# Patient Record
Sex: MALE | Race: WHITE | NOT HISPANIC OR LATINO
[De-identification: names, ages, dates, MRNs, and addresses within clinical notes are randomized per-mention and may not be internally consistent; named-entity substitution may affect disease eponyms.]

---

## 2017-01-12 PROBLEM — Z00.00 ENCOUNTER FOR PREVENTIVE HEALTH EXAMINATION: Status: ACTIVE | Noted: 2017-01-12

## 2017-01-25 ENCOUNTER — APPOINTMENT (OUTPATIENT)
Dept: CARDIOLOGY | Facility: CLINIC | Age: 72
End: 2017-01-25

## 2017-01-31 ENCOUNTER — APPOINTMENT (OUTPATIENT)
Dept: CARDIOLOGY | Facility: CLINIC | Age: 72
End: 2017-01-31

## 2017-05-17 ENCOUNTER — APPOINTMENT (OUTPATIENT)
Dept: CARDIOLOGY | Facility: CLINIC | Age: 72
End: 2017-05-17

## 2017-08-15 ENCOUNTER — APPOINTMENT (OUTPATIENT)
Dept: CARDIOLOGY | Facility: CLINIC | Age: 72
End: 2017-08-15
Payer: MEDICARE

## 2017-08-15 PROCEDURE — A9502: CPT

## 2017-08-15 PROCEDURE — 78452 HT MUSCLE IMAGE SPECT MULT: CPT

## 2017-08-15 PROCEDURE — 93015 CV STRESS TEST SUPVJ I&R: CPT

## 2017-08-17 ENCOUNTER — APPOINTMENT (OUTPATIENT)
Dept: CARDIOLOGY | Facility: CLINIC | Age: 72
End: 2017-08-17

## 2017-08-29 ENCOUNTER — APPOINTMENT (OUTPATIENT)
Dept: ELECTROPHYSIOLOGY | Facility: CLINIC | Age: 72
End: 2017-08-29
Payer: MEDICARE

## 2017-08-29 VITALS
SYSTOLIC BLOOD PRESSURE: 118 MMHG | DIASTOLIC BLOOD PRESSURE: 80 MMHG | HEIGHT: 71 IN | HEART RATE: 66 BPM | WEIGHT: 188 LBS | BODY MASS INDEX: 26.32 KG/M2

## 2017-08-29 DIAGNOSIS — Z87.891 PERSONAL HISTORY OF NICOTINE DEPENDENCE: ICD-10-CM

## 2017-08-29 DIAGNOSIS — Z78.9 OTHER SPECIFIED HEALTH STATUS: ICD-10-CM

## 2017-08-29 DIAGNOSIS — Z82.49 FAMILY HISTORY OF ISCHEMIC HEART DISEASE AND OTHER DISEASES OF THE CIRCULATORY SYSTEM: ICD-10-CM

## 2017-08-29 PROCEDURE — 93000 ELECTROCARDIOGRAM COMPLETE: CPT

## 2017-08-29 PROCEDURE — 99203 OFFICE O/P NEW LOW 30 MIN: CPT

## 2017-09-05 PROBLEM — Z78.9 SOCIAL ALCOHOL USE: Status: ACTIVE | Noted: 2017-09-05

## 2017-09-05 PROBLEM — Z82.49 FAMILY HISTORY OF CORONARY ARTERIOSCLEROSIS: Status: ACTIVE | Noted: 2017-09-05

## 2017-09-05 PROBLEM — Z87.891 FORMER SMOKER: Status: ACTIVE | Noted: 2017-09-05

## 2017-09-05 RX ORDER — METFORMIN HYDROCHLORIDE 500 MG/1
500 TABLET, COATED ORAL
Refills: 0 | Status: ACTIVE | COMMUNITY

## 2017-11-20 ENCOUNTER — RESULT REVIEW (OUTPATIENT)
Age: 72
End: 2017-11-20

## 2017-11-20 LAB — INR PPP: 2.7

## 2017-11-22 ENCOUNTER — CLINICAL ADVICE (OUTPATIENT)
Age: 72
End: 2017-11-22

## 2017-11-30 ENCOUNTER — OTHER (OUTPATIENT)
Age: 72
End: 2017-11-30

## 2017-11-30 LAB — INR PPP: 2.9

## 2017-12-20 ENCOUNTER — RESULT REVIEW (OUTPATIENT)
Age: 72
End: 2017-12-20

## 2017-12-20 LAB — INR PPP: 2.5

## 2018-01-05 ENCOUNTER — RESULT REVIEW (OUTPATIENT)
Age: 73
End: 2018-01-05

## 2018-01-05 LAB — INR PPP: 3

## 2018-01-22 ENCOUNTER — RESULT REVIEW (OUTPATIENT)
Age: 73
End: 2018-01-22

## 2018-01-22 LAB — INR PPP: 3

## 2018-01-25 ENCOUNTER — RESULT REVIEW (OUTPATIENT)
Age: 73
End: 2018-01-25

## 2018-01-25 LAB — INR PPP: 2.5

## 2018-01-26 ENCOUNTER — RESULT REVIEW (OUTPATIENT)
Age: 73
End: 2018-01-26

## 2018-01-26 LAB — INR PPP: 2.5

## 2018-02-09 LAB — INR PPP: 2.8

## 2018-02-23 ENCOUNTER — RESULT REVIEW (OUTPATIENT)
Age: 73
End: 2018-02-23

## 2018-02-23 LAB — INR PPP: 2.9

## 2018-03-08 ENCOUNTER — RESULT REVIEW (OUTPATIENT)
Age: 73
End: 2018-03-08

## 2018-03-08 LAB — INR PPP: 2.9

## 2018-03-22 LAB — INR PPP: 2.9

## 2018-04-09 LAB — INR PPP: 3

## 2018-04-24 LAB — INR PPP: 2.9

## 2018-05-18 ENCOUNTER — RESULT REVIEW (OUTPATIENT)
Age: 73
End: 2018-05-18

## 2018-05-18 LAB — INR PPP: 2.6

## 2018-06-04 ENCOUNTER — RESULT REVIEW (OUTPATIENT)
Age: 73
End: 2018-06-04

## 2018-06-04 LAB — INR PPP: 2.5

## 2018-06-19 ENCOUNTER — RESULT REVIEW (OUTPATIENT)
Age: 73
End: 2018-06-19

## 2018-06-19 LAB — INR PPP: 2.9

## 2018-06-27 LAB — INR PPP: NORMAL

## 2018-06-28 ENCOUNTER — APPOINTMENT (OUTPATIENT)
Dept: CARDIOLOGY | Facility: CLINIC | Age: 73
End: 2018-06-28
Payer: MEDICARE

## 2018-06-28 LAB — INR PPP: 3.2 RATIO

## 2018-06-28 PROCEDURE — 85610 PROTHROMBIN TIME: CPT | Mod: QW

## 2018-06-29 ENCOUNTER — APPOINTMENT (OUTPATIENT)
Dept: CARDIOLOGY | Facility: CLINIC | Age: 73
End: 2018-06-29
Payer: MEDICARE

## 2018-06-29 LAB — INR PPP: 2 RATIO

## 2018-06-29 PROCEDURE — 85610 PROTHROMBIN TIME: CPT | Mod: QW

## 2018-07-09 ENCOUNTER — RESULT REVIEW (OUTPATIENT)
Age: 73
End: 2018-07-09

## 2018-07-09 LAB — INR PPP: 2.5

## 2018-07-24 ENCOUNTER — MEDICATION RENEWAL (OUTPATIENT)
Age: 73
End: 2018-07-24

## 2018-07-24 ENCOUNTER — RX RENEWAL (OUTPATIENT)
Age: 73
End: 2018-07-24

## 2018-07-24 ENCOUNTER — RESULT REVIEW (OUTPATIENT)
Age: 73
End: 2018-07-24

## 2018-07-24 LAB — INR PPP: 2.5

## 2018-08-04 ENCOUNTER — TRANSCRIPTION ENCOUNTER (OUTPATIENT)
Age: 73
End: 2018-08-04

## 2018-08-07 ENCOUNTER — RESULT REVIEW (OUTPATIENT)
Age: 73
End: 2018-08-07

## 2018-08-07 LAB — INR PPP: 2.5

## 2018-08-15 ENCOUNTER — RESULT REVIEW (OUTPATIENT)
Age: 73
End: 2018-08-15

## 2018-08-15 LAB — INR PPP: 2.7

## 2018-08-29 ENCOUNTER — NON-APPOINTMENT (OUTPATIENT)
Age: 73
End: 2018-08-29

## 2018-08-29 ENCOUNTER — APPOINTMENT (OUTPATIENT)
Dept: CARDIOLOGY | Facility: CLINIC | Age: 73
End: 2018-08-29
Payer: MEDICARE

## 2018-08-29 VITALS
DIASTOLIC BLOOD PRESSURE: 68 MMHG | BODY MASS INDEX: 27.02 KG/M2 | WEIGHT: 193 LBS | HEIGHT: 71 IN | HEART RATE: 75 BPM | SYSTOLIC BLOOD PRESSURE: 120 MMHG

## 2018-08-29 PROCEDURE — 99215 OFFICE O/P EST HI 40 MIN: CPT

## 2018-08-29 PROCEDURE — 93000 ELECTROCARDIOGRAM COMPLETE: CPT

## 2018-08-29 RX ORDER — WARFARIN 5 MG/1
5 TABLET ORAL
Qty: 135 | Refills: 3 | Status: DISCONTINUED | COMMUNITY
Start: 2018-07-24 | End: 2018-08-29

## 2018-09-04 LAB — INR PPP: 2.7

## 2018-09-17 ENCOUNTER — RESULT REVIEW (OUTPATIENT)
Age: 73
End: 2018-09-17

## 2018-09-17 LAB — INR PPP: 1.7

## 2018-09-18 ENCOUNTER — RESULT REVIEW (OUTPATIENT)
Age: 73
End: 2018-09-18

## 2018-09-19 ENCOUNTER — APPOINTMENT (OUTPATIENT)
Dept: CARDIOLOGY | Facility: CLINIC | Age: 73
End: 2018-09-19
Payer: MEDICARE

## 2018-09-19 LAB — INR PPP: 2

## 2018-09-19 PROCEDURE — 85610 PROTHROMBIN TIME: CPT | Mod: QW

## 2018-09-23 ENCOUNTER — RX RENEWAL (OUTPATIENT)
Age: 73
End: 2018-09-23

## 2018-10-05 LAB — INR PPP: 3

## 2018-10-22 LAB — INR PPP: 2.7

## 2018-11-02 ENCOUNTER — RESULT REVIEW (OUTPATIENT)
Age: 73
End: 2018-11-02

## 2018-11-05 LAB — INR PPP: 2.9

## 2018-11-08 ENCOUNTER — RESULT REVIEW (OUTPATIENT)
Age: 73
End: 2018-11-08

## 2018-11-08 ENCOUNTER — APPOINTMENT (OUTPATIENT)
Dept: CARDIOLOGY | Facility: CLINIC | Age: 73
End: 2018-11-08
Payer: MEDICARE

## 2018-11-08 LAB
INR PPP: 3.6 RATIO
QUALITY CONTROL: YES

## 2018-11-08 PROCEDURE — 85610 PROTHROMBIN TIME: CPT | Mod: QW

## 2018-11-13 ENCOUNTER — RESULT REVIEW (OUTPATIENT)
Age: 73
End: 2018-11-13

## 2018-11-13 ENCOUNTER — APPOINTMENT (OUTPATIENT)
Dept: CARDIOLOGY | Facility: CLINIC | Age: 73
End: 2018-11-13
Payer: MEDICARE

## 2018-11-13 LAB — INR PPP: 2.3 RATIO

## 2018-11-13 PROCEDURE — 85610 PROTHROMBIN TIME: CPT | Mod: QW

## 2018-11-20 ENCOUNTER — APPOINTMENT (OUTPATIENT)
Dept: CARDIOLOGY | Facility: CLINIC | Age: 73
End: 2018-11-20

## 2018-12-17 ENCOUNTER — RESULT REVIEW (OUTPATIENT)
Age: 73
End: 2018-12-17

## 2018-12-17 LAB — INR PPP: 3

## 2018-12-28 ENCOUNTER — RESULT REVIEW (OUTPATIENT)
Age: 73
End: 2018-12-28

## 2018-12-28 LAB — INR PPP: 2.9

## 2019-01-14 ENCOUNTER — RESULT REVIEW (OUTPATIENT)
Age: 74
End: 2019-01-14

## 2019-01-14 LAB — INR PPP: 3

## 2019-02-06 ENCOUNTER — RESULT REVIEW (OUTPATIENT)
Age: 74
End: 2019-02-06

## 2019-02-06 LAB — INR PPP: 3

## 2019-03-05 LAB — INR PPP: 3

## 2019-03-20 LAB — INR PPP: 3

## 2019-04-19 ENCOUNTER — CLINICAL ADVICE (OUTPATIENT)
Age: 74
End: 2019-04-19

## 2019-04-25 ENCOUNTER — APPOINTMENT (OUTPATIENT)
Dept: CARDIOLOGY | Facility: CLINIC | Age: 74
End: 2019-04-25
Payer: MEDICARE

## 2019-04-25 ENCOUNTER — NON-APPOINTMENT (OUTPATIENT)
Age: 74
End: 2019-04-25

## 2019-04-25 VITALS
BODY MASS INDEX: 27.02 KG/M2 | DIASTOLIC BLOOD PRESSURE: 60 MMHG | OXYGEN SATURATION: 97 % | HEART RATE: 66 BPM | HEIGHT: 71 IN | WEIGHT: 193 LBS | SYSTOLIC BLOOD PRESSURE: 104 MMHG

## 2019-04-25 PROCEDURE — 99214 OFFICE O/P EST MOD 30 MIN: CPT

## 2019-04-25 PROCEDURE — 93000 ELECTROCARDIOGRAM COMPLETE: CPT

## 2019-04-25 RX ORDER — APIXABAN 5 MG/1
5 TABLET, FILM COATED ORAL
Qty: 180 | Refills: 0 | Status: ACTIVE | COMMUNITY

## 2019-04-25 RX ORDER — SILDENAFIL CITRATE 50 MG/1
50 TABLET, FILM COATED ORAL
Refills: 0 | Status: DISCONTINUED | COMMUNITY
End: 2019-04-25

## 2019-04-25 RX ORDER — ENOXAPARIN SODIUM 100 MG/ML
80 INJECTION SUBCUTANEOUS
Qty: 10 | Refills: 0 | Status: DISCONTINUED | COMMUNITY
Start: 2018-08-29 | End: 2019-04-25

## 2019-04-25 RX ORDER — WARFARIN SODIUM 5 MG/1
5 TABLET ORAL
Refills: 0 | Status: DISCONTINUED | COMMUNITY
End: 2019-04-25

## 2019-04-25 NOTE — REASON FOR VISIT
[Follow-Up - Clinic] : a clinic follow-up of [Atrial Fibrillation] : atrial fibrillation [Hyperlipidemia] : hyperlipidemia [Hypertension] : hypertension [FreeTextEntry1] : 73-year-old comes in for preoperative cardiac assessment prior to endoscopy\par He plays one and a half hour of singles tennis on a regular basis without any complaint of chest pain, shortness of breath, PND, orthopnea.\par He has no pedal edema.\par He has no dizziness or syncopal episode.\par He has no claudication pain.\par He has no recent hospital admission for acute coronary syndrome, syncope, or congestive heart failure.\par He has not had any neurological/cardioembolic event.\par He has not had any bleeding complications.\par His INR is stable.\par He has no history of complications related to anesthesia in the past.\par

## 2019-04-25 NOTE — HISTORY OF PRESENT ILLNESS
[FreeTextEntry1] : Medical history includes\par Chronic atrial fibrillation. Asymptomatic pauses. On chronic anticoagulation with warfarin as his chores without any complications. Follows his INR at home, which remains between 2 and 3.\par Hypercholesterolemia on statin therapy.\par Type 2 diabetes mellitus, controlled. No complications.\par Coronary artery disease. Nonobstructive. Branch disease. Coronary angiography. 2013. The first diagonal 50%.\par Thoracic aortic dilatation. Stable. Last echocardiogram 2017\par Tonsillar cancer. Status post surgery and radiation 2014

## 2019-04-25 NOTE — DISCUSSION/SUMMARY
[FreeTextEntry1] : 74-year-old with excellent functional status of >5 METs without any signs of CAD, CHF , stable atrial fibrillation , and on chronic anticoagulation in the presence of preserved LV systolic function, nonischemic myocardial perfusion scan in 2017. EKG at present without any acute changes.\par \par At present, there are no active cardiac conditions.\par No recent unstable coronary syndrome, decompensated heart failure, severe valvular heart disease or significant dysrhythmias.\par The clinical benefit of the proposed procedure outweighs the associated cardiovascular risk.\par Risk not attenuated with further cardiovascular testing.\par Prior testing as outlined above.\par Control blood pressure, heart rate, pulse oximetry perioperatively.\par If required appropriate intravenous medication for the outpatient oral medication for blood pressure, heart rate control.\par DVT prophylaxis as per indication.\par For surgery and invasive procedures, recommend to discontinue apixaban at least 24-48 hours prior to elective surgery or invasive procedures with a moderate-to-high risk of clinically significant bleeding. Anticoagulation bridging during the 48 hours apixaban is interrupted and prior to the intervention is not generally required. Reinitiate apixaban when adequate hemostasis has been achieved.  If oral therapy cannot be administered, then consider administration of a parenteral anticoagulant. Note excess discontinuation of any oral anticoagulant, including apixaban, increases the risk of thrombotic events. Patient was counseled and verbalizes understanding of these associated risks\par It can be restarted postoperatively, if no intervention is done, and no active bleeding. Same day in the evening or the next day. This can be also decided by gastroenterologist or discussed with cardiologist if needed to facilitate based on intervention done.\par \par From general cardiac point of view. He would also have his echocardiogram to evaluate her ejection fraction wall motion and presence of coronary artery disease and chronic atrial fibrillation. This should not have any significant impact on perioperative risk assessment. He will be seen by me in one year or for any change in his symptoms.

## 2019-04-25 NOTE — PHYSICAL EXAM
[General Appearance - Well Developed] : well developed [Normal Appearance] : normal appearance [General Appearance - Well Nourished] : well nourished [Well Groomed] : well groomed [No Deformities] : no deformities [General Appearance - In No Acute Distress] : no acute distress [Normal Conjunctiva] : the conjunctiva exhibited no abnormalities [Eyelids - No Xanthelasma] : the eyelids demonstrated no xanthelasmas [No Oral Pallor] : no oral pallor [Normal Oral Mucosa] : normal oral mucosa [Normal Jugular Venous A Waves Present] : normal jugular venous A waves present [No Oral Cyanosis] : no oral cyanosis [Normal Jugular Venous V Waves Present] : normal jugular venous V waves present [No Jugular Venous Escobar A Waves] : no jugular venous escobar A waves [Exaggerated Use Of Accessory Muscles For Inspiration] : no accessory muscle use [Respiration, Rhythm And Depth] : normal respiratory rhythm and effort [Heart Sounds] : normal S1 and S2 [Auscultation Breath Sounds / Voice Sounds] : lungs were clear to auscultation bilaterally [Murmurs] : no murmurs present [Arterial Pulses Normal] : the arterial pulses were normal [Edema] : no peripheral edema present [Veins - Varicosity Changes] : no varicosital changes were noted in the lower extremities [FreeTextEntry1] :  irregular [Abdomen Soft] : soft [Abdomen Tenderness] : non-tender [Abdomen Mass (___ Cm)] : no abdominal mass palpated [Gait - Sufficient For Exercise Testing] : the gait was sufficient for exercise testing [Abnormal Walk] : normal gait [Cyanosis, Localized] : no localized cyanosis [Nail Clubbing] : no clubbing of the fingernails [Petechial Hemorrhages (___cm)] : no petechial hemorrhages [Skin Color & Pigmentation] : normal skin color and pigmentation [] : no rash [No Venous Stasis] : no venous stasis [Skin Lesions] : no skin lesions [No Skin Ulcers] : no skin ulcer [No Xanthoma] : no  xanthoma was observed [Oriented To Time, Place, And Person] : oriented to person, place, and time [Mood] : the mood was normal [Affect] : the affect was normal [No Anxiety] : not feeling anxious

## 2019-04-25 NOTE — ASSESSMENT
[FreeTextEntry1] : EKG  ordered and interpreted by me. August 29, 2018  indication atrial fibrillation. Preoperative assessment. Interpretation by me. Atrial fibrillation. Nonspecific ST-T changes.\par \par Labs which were done in August were reviewed, which showed stable CBC, CMP, lipid panel.\par Most recent cardiac testing includes\par Echocardiogram  May 17, 2017  preserved LV systolic function. Stable thoracic aortic dimension. Mild mitral regurgitation.\par Stress myocardial perfusion scan. August 15, 2017 equivocal ischemic EKG changes at 10 METs of workload.\par Perfusion scan showing preserved LV systolic function and fixed apical inferior defect, probably related to attenuation.\par \par Reviewed on April 25, 2019\par EKG April 25, 2019. Indication atrial fibrillation. Interpretation atrial fibrillation. Nonspecific ST-T changes.

## 2020-07-07 RX ORDER — ATENOLOL 25 MG/1
25 TABLET ORAL
Qty: 45 | Refills: 0 | Status: ACTIVE | COMMUNITY
Start: 2018-09-23 | End: 1900-01-01

## 2020-08-14 ENCOUNTER — TRANSCRIPTION ENCOUNTER (OUTPATIENT)
Age: 75
End: 2020-08-14

## 2020-10-06 ENCOUNTER — APPOINTMENT (OUTPATIENT)
Dept: CARDIOLOGY | Facility: CLINIC | Age: 75
End: 2020-10-06
Payer: MEDICARE

## 2020-10-06 ENCOUNTER — NON-APPOINTMENT (OUTPATIENT)
Age: 75
End: 2020-10-06

## 2020-10-06 PROCEDURE — 93000 ELECTROCARDIOGRAM COMPLETE: CPT

## 2020-12-28 ENCOUNTER — APPOINTMENT (OUTPATIENT)
Dept: CARDIOLOGY | Facility: CLINIC | Age: 75
End: 2020-12-28

## 2021-02-17 ENCOUNTER — APPOINTMENT (OUTPATIENT)
Dept: CARDIOLOGY | Facility: CLINIC | Age: 76
End: 2021-02-17
Payer: MEDICARE

## 2021-02-17 ENCOUNTER — APPOINTMENT (OUTPATIENT)
Dept: CARDIOLOGY | Facility: CLINIC | Age: 76
End: 2021-02-17

## 2021-02-17 VITALS
BODY MASS INDEX: 26.88 KG/M2 | SYSTOLIC BLOOD PRESSURE: 120 MMHG | HEIGHT: 71 IN | TEMPERATURE: 97.1 F | HEART RATE: 64 BPM | OXYGEN SATURATION: 98 % | WEIGHT: 192 LBS | DIASTOLIC BLOOD PRESSURE: 70 MMHG

## 2021-02-17 DIAGNOSIS — Z85.89 PERSONAL HISTORY OF MALIGNANT NEOPLASM OF OTHER ORGANS AND SYSTEMS: ICD-10-CM

## 2021-02-17 PROCEDURE — 99214 OFFICE O/P EST MOD 30 MIN: CPT

## 2021-02-17 NOTE — PHYSICAL EXAM
[General Appearance - Well Developed] : well developed [Normal Appearance] : normal appearance [Well Groomed] : well groomed [General Appearance - Well Nourished] : well nourished [No Deformities] : no deformities [General Appearance - In No Acute Distress] : no acute distress [Normal Conjunctiva] : the conjunctiva exhibited no abnormalities [Eyelids - No Xanthelasma] : the eyelids demonstrated no xanthelasmas [Normal Oral Mucosa] : normal oral mucosa [No Oral Pallor] : no oral pallor [No Oral Cyanosis] : no oral cyanosis [Normal Jugular Venous A Waves Present] : normal jugular venous A waves present [Normal Jugular Venous V Waves Present] : normal jugular venous V waves present [No Jugular Venous Escobar A Waves] : no jugular venous escobar A waves [Respiration, Rhythm And Depth] : normal respiratory rhythm and effort [Exaggerated Use Of Accessory Muscles For Inspiration] : no accessory muscle use [Auscultation Breath Sounds / Voice Sounds] : lungs were clear to auscultation bilaterally [Heart Sounds] : normal S1 and S2 [Murmurs] : no murmurs present [Arterial Pulses Normal] : the arterial pulses were normal [Edema] : no peripheral edema present [Veins - Varicosity Changes] : no varicosital changes were noted in the lower extremities [FreeTextEntry1] :  irregular [Abdomen Soft] : soft [Abdomen Tenderness] : non-tender [Abdomen Mass (___ Cm)] : no abdominal mass palpated [Abnormal Walk] : normal gait [Gait - Sufficient For Exercise Testing] : the gait was sufficient for exercise testing [Nail Clubbing] : no clubbing of the fingernails [Cyanosis, Localized] : no localized cyanosis [Petechial Hemorrhages (___cm)] : no petechial hemorrhages [Skin Color & Pigmentation] : normal skin color and pigmentation [] : no rash [No Venous Stasis] : no venous stasis [Skin Lesions] : no skin lesions [No Skin Ulcers] : no skin ulcer [No Xanthoma] : no  xanthoma was observed [Oriented To Time, Place, And Person] : oriented to person, place, and time [Affect] : the affect was normal [Mood] : the mood was normal [No Anxiety] : not feeling anxious

## 2021-02-17 NOTE — HISTORY OF PRESENT ILLNESS
[FreeTextEntry1] : Medical history includes:\par \par Chronic atrial fibrillation. Asymptomatic pauses. On chronic anticoagulation with warfarin as his chores without any complications. Follows his INR at home, which remains between 2 and 3.\par Hypercholesterolemia on statin therapy.\par Type 2 diabetes mellitus, controlled. No complications.\par Coronary artery disease. Nonobstructive. Branch disease. Coronary angiography. 2013. The first diagonal 50%.\par Thoracic aortic dilatation. Stable. Last echocardiogram 2020\par Tonsillar cancer. Status post surgery and radiation 2014

## 2021-02-17 NOTE — DISCUSSION/SUMMARY
[FreeTextEntry1] : 75-year-old with excellent functional status without any signs of CAD, CHF , stable atrial fibrillation , and on chronic anticoagulation in the presence of preserved LV systolic function, nonischemic myocardial perfusion scan in 2017. Echocardiogram February 2020 due to above\par \par At present, there are no active cardiac conditions.\par No recent unstable coronary syndrome, decompensated heart failure, severe valvular heart disease or significant dysrhythmias.\par \par Follow-up atrial size and aortic size on echocardiogram lyubov. He will call for results. Follow-up in 1 year.\par \par Fasting lipid profile from November 2020 was reviewed. LDL 84.\par \par \par \par Thank you for this referral and allowing me to participate in the care of this patient.  If I can be of any further help or  if you have any questions, please do not hesitate to contact me\par \par \par Sincerely,\par \par Erasmo Rivera MD, FACC, EMILIA

## 2021-02-17 NOTE — ASSESSMENT
[FreeTextEntry1] : EKG  August 29, 2018  indication atrial fibrillation. Preoperative assessment. Interpretation by me. Atrial fibrillation. Nonspecific ST-T changes.\par \par Labs which were done in August were reviewed, which showed stable CBC, CMP, lipid panel.\par Most recent cardiac testing includes\par \par Echocardiogram  May 17, 2017  preserved LV systolic function. Stable thoracic aortic dimension. Mild mitral regurgitation.\par \par Stress myocardial perfusion scan. August 15, 2017 equivocal ischemic EKG changes at 10 METs of workload.\par Perfusion scan showing preserved LV systolic function and fixed apical inferior defect, probably related to attenuation.\par \par EKG April 25, 2019. Indication atrial fibrillation. Interpretation atrial fibrillation. Nonspecific ST-T changes.\par \par Echocardiogram February 2020: Dilated atria. Normal EF. Ascending aorta measurements were normal

## 2021-02-17 NOTE — REASON FOR VISIT
[Follow-Up - Clinic] : a clinic follow-up of [Atrial Fibrillation] : atrial fibrillation [Hyperlipidemia] : hyperlipidemia [Hypertension] : hypertension [FreeTextEntry1] : 75-year-old :\par Very active: He plays one and a half hour of singles tennis on a regular basis without any complaint of chest pain, shortness of breath, PND, orthopnea.\par He has no pedal edema.\par He has no dizziness or syncopal episode.\par He has no claudication pain.\par He has no recent hospital admission for acute coronary syndrome, syncope, or congestive heart failure.\par He has not had any neurological/cardioembolic event.\par He has not had any bleeding complications.\par His INR is stable.\par He has no history of complications related to anesthesia in the past.\par

## 2021-06-07 ENCOUNTER — APPOINTMENT (OUTPATIENT)
Dept: CARDIOLOGY | Facility: CLINIC | Age: 76
End: 2021-06-07

## 2021-06-24 ENCOUNTER — APPOINTMENT (OUTPATIENT)
Dept: CARDIOLOGY | Facility: CLINIC | Age: 76
End: 2021-06-24
Payer: MEDICARE

## 2021-06-24 PROCEDURE — 93306 TTE W/DOPPLER COMPLETE: CPT

## 2021-07-08 ENCOUNTER — TRANSCRIPTION ENCOUNTER (OUTPATIENT)
Age: 76
End: 2021-07-08

## 2021-07-14 ENCOUNTER — APPOINTMENT (OUTPATIENT)
Dept: CARDIOLOGY | Facility: CLINIC | Age: 76
End: 2021-07-14
Payer: MEDICARE

## 2021-07-14 VITALS
BODY MASS INDEX: 27.3 KG/M2 | WEIGHT: 195 LBS | DIASTOLIC BLOOD PRESSURE: 68 MMHG | SYSTOLIC BLOOD PRESSURE: 118 MMHG | HEART RATE: 73 BPM | HEIGHT: 71 IN | OXYGEN SATURATION: 96 %

## 2021-07-14 DIAGNOSIS — Z01.810 ENCOUNTER FOR PREPROCEDURAL CARDIOVASCULAR EXAMINATION: ICD-10-CM

## 2021-07-14 PROCEDURE — 99215 OFFICE O/P EST HI 40 MIN: CPT

## 2021-07-14 RX ORDER — ADHESIVE TAPE 3"X 2.3 YD
50 MCG TAPE, NON-MEDICATED TOPICAL
Refills: 0 | Status: ACTIVE | COMMUNITY

## 2021-07-14 RX ORDER — EZETIMIBE 10 MG/1
10 TABLET ORAL DAILY
Refills: 0 | Status: ACTIVE | COMMUNITY

## 2021-07-14 NOTE — ASSESSMENT
[FreeTextEntry1] : EKG  ordered and interpreted by me. August 29, 2018  indication atrial fibrillation. Preoperative assessment. Interpretation by me. Atrial fibrillation. Nonspecific ST-T changes.\par \par Labs which were done in August were reviewed, which showed stable CBC, CMP, lipid panel.\par Most recent cardiac testing includes\par Echocardiogram  May 17, 2017  preserved LV systolic function. Stable thoracic aortic dimension. Mild mitral regurgitation.\par Stress myocardial perfusion scan. August 15, 2017 equivocal ischemic EKG changes at 10 METs of workload.\par Perfusion scan showing preserved LV systolic function and fixed apical inferior defect, probably related to attenuation.\par \par Reviewed on April 25, 2019\par EKG April 25, 2019. Indication atrial fibrillation. Interpretation atrial fibrillation. Nonspecific ST-T changes.\par \par Reviewed on July 14, 2021.\par Labs July 10, 2021 sodium 141 potassium 4.8 creatinine 0.95 LFT normal CBC stable\par Most recent LDL cholesterol was 77\par Echocardiogram as noted above

## 2021-07-14 NOTE — CARDIOLOGY SUMMARY
[de-identified] : October 6, 2020.  Atrial fibrillation.  Right bundle branch block [de-identified] : August 15, 2017 equivocal for ischemia at 10 METs of workload. [de-identified] : June 24, 2021 EF 65% mild mitral regurgitation aortic root 4.2 dilated left atrium concentric LVH moderate to severe right atrial enlargement mild tricuspid regurgitation PASP 27 mmHg. [de-identified] : 2013.  Branch disease.  50% D1.  Coronary angiography at [facility].

## 2021-07-14 NOTE — PHYSICAL EXAM
[General Appearance - Well Developed] : well developed [Normal Appearance] : normal appearance [Well Groomed] : well groomed [General Appearance - Well Nourished] : well nourished [No Deformities] : no deformities [General Appearance - In No Acute Distress] : no acute distress [Normal Conjunctiva] : the conjunctiva exhibited no abnormalities [Eyelids - No Xanthelasma] : the eyelids demonstrated no xanthelasmas [Normal Oral Mucosa] : normal oral mucosa [No Oral Pallor] : no oral pallor [No Oral Cyanosis] : no oral cyanosis [Normal Jugular Venous A Waves Present] : normal jugular venous A waves present [Normal Jugular Venous V Waves Present] : normal jugular venous V waves present [No Jugular Venous Escobar A Waves] : no jugular venous escobar A waves [Respiration, Rhythm And Depth] : normal respiratory rhythm and effort [Exaggerated Use Of Accessory Muscles For Inspiration] : no accessory muscle use [Auscultation Breath Sounds / Voice Sounds] : lungs were clear to auscultation bilaterally [Heart Sounds] : normal S1 and S2 [Murmurs] : no murmurs present [Arterial Pulses Normal] : the arterial pulses were normal [Edema] : no peripheral edema present [Veins - Varicosity Changes] : no varicosital changes were noted in the lower extremities [Abdomen Soft] : soft [Abdomen Tenderness] : non-tender [Abdomen Mass (___ Cm)] : no abdominal mass palpated [Abnormal Walk] : normal gait [Gait - Sufficient For Exercise Testing] : the gait was sufficient for exercise testing [Nail Clubbing] : no clubbing of the fingernails [Cyanosis, Localized] : no localized cyanosis [Petechial Hemorrhages (___cm)] : no petechial hemorrhages [Skin Color & Pigmentation] : normal skin color and pigmentation [] : no rash [No Venous Stasis] : no venous stasis [Skin Lesions] : no skin lesions [No Skin Ulcers] : no skin ulcer [No Xanthoma] : no  xanthoma was observed [Oriented To Time, Place, And Person] : oriented to person, place, and time [Affect] : the affect was normal [Mood] : the mood was normal [No Anxiety] : not feeling anxious [FreeTextEntry1] :  irregular irregular

## 2021-07-14 NOTE — REASON FOR VISIT
[Symptom and Test Evaluation] : symptom and test evaluation [Arrhythmia/ECG Abnorrmalities] : arrhythmia/ECG abnormalities [Hyperlipidemia] : hyperlipidemia [Hypertension] : hypertension [Coronary Artery Disease] : coronary artery disease [Carotid, Aortic and Peripheral Vascular Disease] : carotid, aortic and peripheral vascular disease [FreeTextEntry3] : Dr. Lane [FreeTextEntry1] : 76-year-old gentleman comes in for follow-up consultation.  Labs were reviewed.  Echocardiogram was reviewed\par He plays one and a half hour of singles tennis on a regular basis without any complaint of chest pain, shortness of breath, PND, orthopnea.\par He has no pedal edema.\par He has no dizziness or syncopal episode.\par He has no claudication pain.\par He has no recent hospital admission for acute coronary syndrome, syncope, or congestive heart failure.\par He has not had any neurological/cardioembolic event.\par He has not had any bleeding complications.\par He has left elbow pain which is being evaluated.\par \par

## 2021-07-14 NOTE — DISCUSSION/SUMMARY
[FreeTextEntry1] : 76-year-old with excellent functional status of >5 METs without any signs of CAD, CHF , stable atrial fibrillation , and on chronic anticoagulation in the presence of preserved LV systolic function, nonischemic myocardial perfusion scan in 2017. \par \par #1 echocardiogram showing thoracic aortic ectasia.  Dimension 4.2 cm.  Slightly increased compared to before.  Also mild LVH.  Biatrial enlargement.  Diastolic LV dysfunction.\par Recommended\par Serum light chains to assess any protein disorder\par Consider CT angio for coronaries as well as aorta along with LV evaluation versus MR angio of the heart with and without contrast.  He will discuss this with his cardiologist in the city and decide.  Risk benefits alternatives of the procedure were reviewed.  Risk of contrast and radiation were reviewed.\par Continue to follow echocardiogram in a year.\par 2.  Chronic atrial fibrillation.  Rate well controlled.  Stable asymptomatic.  On chronic anticoagulation.  High risk medication.  No bleeding.\par No complication.\par #3 coronary artery disease.  Branch disease in the past.  Evaluation as discussed above.  Continue secondary prevention with aggressive lifestyle and risk factor modification which includes statin therapy.\par 4.  Dyslipidemia.  On Pravachol.  Along with Zetia.  LDL goal less than 70.  It is very close to it.  Continue lifestyle modifications.\par 5.  Hyperglycemia.  Type 2 diabetes mellitus.  On Metformin.  Continue lifestyle modification.  Follow-up with primary care physician.\par 6.  Left elbow pain. .  Mildly increased uric acid.  Careful with anti-inflammatory medication if used consider use of PPI in presence of Eliquis.\par \par Counseling regarding low saturated fat, salt and carbohydrate intake was reviewed. Active lifestyle and regular. Exercise along with weight management is advised.\par All the above were at length reviewed. Answered all the questions. Thank you very much for this kind referral. Please do not hesitate to give me a call for any question.\par Part of this transcription was done with voice recognition software and phonetically similar errors are common. I apologize for that. Please do not hesitate to call for any questions due to above.\par \par He will call for the results\par Follow-up in 6 months or for any change in his symptoms

## 2021-10-06 ENCOUNTER — NON-APPOINTMENT (OUTPATIENT)
Age: 76
End: 2021-10-06

## 2021-11-19 ENCOUNTER — NON-APPOINTMENT (OUTPATIENT)
Age: 76
End: 2021-11-19

## 2022-01-04 ENCOUNTER — APPOINTMENT (OUTPATIENT)
Dept: CARDIOLOGY | Facility: CLINIC | Age: 77
End: 2022-01-04
Payer: MEDICARE

## 2022-01-04 VITALS
HEIGHT: 71 IN | OXYGEN SATURATION: 97 % | DIASTOLIC BLOOD PRESSURE: 78 MMHG | SYSTOLIC BLOOD PRESSURE: 124 MMHG | WEIGHT: 190 LBS | HEART RATE: 73 BPM | BODY MASS INDEX: 26.6 KG/M2

## 2022-01-04 DIAGNOSIS — H81.10 BENIGN PAROXYSMAL VERTIGO, UNSPECIFIED EAR: ICD-10-CM

## 2022-01-04 DIAGNOSIS — I51.7 CARDIOMEGALY: ICD-10-CM

## 2022-01-04 PROCEDURE — 99215 OFFICE O/P EST HI 40 MIN: CPT

## 2022-01-04 NOTE — PHYSICAL EXAM
[General Appearance - Well Developed] : well developed [Normal Appearance] : normal appearance [Well Groomed] : well groomed [General Appearance - Well Nourished] : well nourished [No Deformities] : no deformities [General Appearance - In No Acute Distress] : no acute distress [Normal Conjunctiva] : the conjunctiva exhibited no abnormalities [Eyelids - No Xanthelasma] : the eyelids demonstrated no xanthelasmas [Normal Oral Mucosa] : normal oral mucosa [Normal Jugular Venous V Waves Present] : normal jugular venous V waves present [] : no respiratory distress [Respiration, Rhythm And Depth] : normal respiratory rhythm and effort [Exaggerated Use Of Accessory Muscles For Inspiration] : no accessory muscle use [Auscultation Breath Sounds / Voice Sounds] : lungs were clear to auscultation bilaterally [Heart Sounds] : normal S1 and S2 [Murmurs] : no murmurs present [Arterial Pulses Normal] : the arterial pulses were normal [Edema] : no peripheral edema present [Veins - Varicosity Changes] : no varicosital changes were noted in the lower extremities [Abnormal Walk] : normal gait [Gait - Sufficient For Exercise Testing] : the gait was sufficient for exercise testing [Nail Clubbing] : no clubbing of the fingernails [Cyanosis, Localized] : no localized cyanosis [Oriented To Time, Place, And Person] : oriented to person, place, and time [Affect] : the affect was normal [Mood] : the mood was normal [FreeTextEntry1] :  irregular irregular

## 2022-01-04 NOTE — CARDIOLOGY SUMMARY
[de-identified] : October 6, 2020.  Atrial fibrillation.  Right bundle branch block [de-identified] : August 15, 2017 equivocal for ischemia at 10 METs of workload. [de-identified] : June 24, 2021 EF 65% mild mitral regurgitation aortic root 4.2 dilated left atrium concentric LVH moderate to severe right atrial enlargement mild tricuspid regurgitation PASP 27 mmHg. [de-identified] : 2013.  Branch disease.  50% D1.  Coronary angiography at [facility].

## 2022-01-04 NOTE — REASON FOR VISIT
[Symptom and Test Evaluation] : symptom and test evaluation [Arrhythmia/ECG Abnorrmalities] : arrhythmia/ECG abnormalities [Hyperlipidemia] : hyperlipidemia [Hypertension] : hypertension [Coronary Artery Disease] : coronary artery disease [Carotid, Aortic and Peripheral Vascular Disease] : carotid, aortic and peripheral vascular disease [FreeTextEntry3] : Dr. Lane [FreeTextEntry1] : 76-year-old gentleman comes in for follow-up consultation.  Labs were reviewed.  \par He plays one and a half hour of singles tennis on a regular basis without any complaint of chest pain, shortness of breath, PND, orthopnea.\par He has no pedal edema.\par He has no dizziness or syncopal episode.\par He has no claudication pain.\par He has no recent hospital admission for acute coronary syndrome, syncope, or congestive heart failure.\par He has not had any neurological/cardioembolic event.\par He has not had any bleeding complications.\par He has left elbow pain which is being evaluated.\par \par

## 2022-01-04 NOTE — ASSESSMENT
Blood pressure does show some improved response with the addition of low-dose hydrochlorothiazide however blood pressure is not at goal   Reviewed patient's blood pressure log  Recommend he increase hydrochlorothiazide to 25 mg daily  New scripts sent to pharmacy to indicate new dose change  Continue with diet lifestyle modifications patient has been doing to improve blood pressure  Continue to check BP and keep BP log    Discussed BP target goal  [FreeTextEntry1] : EKG  ordered and interpreted by me. August 29, 2018  indication atrial fibrillation. Preoperative assessment. Interpretation by me. Atrial fibrillation. Nonspecific ST-T changes.\par \par Labs which were done in August were reviewed, which showed stable CBC, CMP, lipid panel.\par Most recent cardiac testing includes\par Echocardiogram  May 17, 2017  preserved LV systolic function. Stable thoracic aortic dimension. Mild mitral regurgitation.\par Stress myocardial perfusion scan. August 15, 2017 equivocal ischemic EKG changes at 10 METs of workload.\par Perfusion scan showing preserved LV systolic function and fixed apical inferior defect, probably related to attenuation.\par \par Reviewed on April 25, 2019\par EKG April 25, 2019. Indication atrial fibrillation. Interpretation atrial fibrillation. Nonspecific ST-T changes.\par \par Reviewed on July 14, 2021.\par Labs July 10, 2021 sodium 141 potassium 4.8 creatinine 0.95 LFT normal CBC stable\par Most recent LDL cholesterol was 77\par Echocardiogram as noted above\par \par Reviewed on January 4, 2022.\par Labs from November 5, 2021 were reviewed.  Stable CBC CMP noted.  HDL 53 triglycerides 89 total cholesterol 152 LDL 81 reviewed

## 2022-01-04 NOTE — DISCUSSION/SUMMARY
[FreeTextEntry1] : 76-year-old with excellent functional status of >5 METs without any signs of CAD, CHF , stable atrial fibrillation , and on chronic anticoagulation in the presence of preserved LV systolic function, nonischemic myocardial perfusion scan in 2020. \par \par #1 echocardiogram showing thoracic aortic ectasia.  N-terminal proBNP normal for his age group.  No signs of volume overload.\par Recommended\par Follow-up echocardiogram.\par 2.  Chronic atrial fibrillation.  Rate well controlled.  Stable asymptomatic.  On chronic anticoagulation.  High risk medication.  No bleeding.\par No complication.\par #3 coronary artery disease.  Branch disease in the past.  Evaluation as discussed above.  Continue secondary prevention with aggressive lifestyle and risk factor modification which includes statin therapy.\par 4.  Dyslipidemia.  On Pravachol.  Along with Zetia.  LDL goal less than 70.  It is very close to it.  Continue lifestyle modifications.\par 5.  Hyperglycemia.  Type 2 diabetes mellitus.  On Metformin.  Continue lifestyle modification.  Follow-up with primary care physician.\par 6.  Echocardiogram, carotid Doppler study and abdominal aortic ultrasound in presence of known coronary artery disease, risk factors, atherosclerotic vascular disease which includes carotid atherosclerosis and abdominal aortic atherosclerosis ordered.\par 7.  Mildly abnormal serum light chain the ratio was normal.  Follow-up with primary care physician or hematology.\par 8.  BPV.  Intermittent dizziness.\par \par Counseling regarding low saturated fat, salt and carbohydrate intake was reviewed. Active lifestyle and regular. Exercise along with weight management is advised.\par All the above were at length reviewed. Answered all the questions. Thank you very much for this kind referral. Please do not hesitate to give me a call for any question.\par Part of this transcription was done with voice recognition software and phonetically similar errors are common. I apologize for that. Please do not hesitate to call for any questions due to above.\par \par He will call for the results\par Follow-up in 6 months or for any change in his symptoms

## 2022-01-31 ENCOUNTER — TRANSCRIPTION ENCOUNTER (OUTPATIENT)
Age: 77
End: 2022-01-31

## 2022-02-02 ENCOUNTER — TRANSCRIPTION ENCOUNTER (OUTPATIENT)
Age: 77
End: 2022-02-02

## 2022-07-12 ENCOUNTER — APPOINTMENT (OUTPATIENT)
Dept: CARDIOLOGY | Facility: CLINIC | Age: 77
End: 2022-07-12

## 2022-07-12 PROCEDURE — 93979 VASCULAR STUDY: CPT

## 2022-07-12 PROCEDURE — 93306 TTE W/DOPPLER COMPLETE: CPT

## 2022-07-12 PROCEDURE — 93880 EXTRACRANIAL BILAT STUDY: CPT

## 2022-07-19 ENCOUNTER — APPOINTMENT (OUTPATIENT)
Dept: CARDIOLOGY | Facility: CLINIC | Age: 77
End: 2022-07-19

## 2022-07-19 VITALS
TEMPERATURE: 98 F | BODY MASS INDEX: 26.32 KG/M2 | WEIGHT: 188 LBS | HEART RATE: 52 BPM | SYSTOLIC BLOOD PRESSURE: 132 MMHG | HEIGHT: 71 IN | DIASTOLIC BLOOD PRESSURE: 84 MMHG | OXYGEN SATURATION: 97 %

## 2022-07-19 PROCEDURE — 99214 OFFICE O/P EST MOD 30 MIN: CPT

## 2022-07-19 NOTE — REVIEW OF SYSTEMS
[Joint Pain] : joint pain [Joint Swelling] : joint swelling [Joint Stiffness] : joint stiffness [Negative] : Heme/Lymph [Vertigo] : vertigo [Dizziness] : dizziness

## 2022-07-28 ENCOUNTER — NON-APPOINTMENT (OUTPATIENT)
Age: 77
End: 2022-07-28

## 2022-08-05 ENCOUNTER — APPOINTMENT (OUTPATIENT)
Dept: CARDIOLOGY | Facility: CLINIC | Age: 77
End: 2022-08-05

## 2022-08-05 PROCEDURE — 93242 EXT ECG>48HR<7D RECORDING: CPT

## 2022-08-08 ENCOUNTER — TRANSCRIPTION ENCOUNTER (OUTPATIENT)
Age: 77
End: 2022-08-08

## 2022-08-11 DIAGNOSIS — R00.1 BRADYCARDIA, UNSPECIFIED: ICD-10-CM

## 2022-08-11 DIAGNOSIS — I45.5 OTHER SPECIFIED HEART BLOCK: ICD-10-CM

## 2022-08-12 ENCOUNTER — NON-APPOINTMENT (OUTPATIENT)
Age: 77
End: 2022-08-12

## 2022-08-12 ENCOUNTER — APPOINTMENT (OUTPATIENT)
Dept: CARDIOLOGY | Facility: CLINIC | Age: 77
End: 2022-08-12

## 2022-08-12 PROCEDURE — 93244 EXT ECG>48HR<7D REV&INTERPJ: CPT

## 2022-08-22 ENCOUNTER — NON-APPOINTMENT (OUTPATIENT)
Age: 77
End: 2022-08-22

## 2022-09-13 ENCOUNTER — NON-APPOINTMENT (OUTPATIENT)
Age: 77
End: 2022-09-13

## 2022-10-21 NOTE — PHYSICAL EXAM
[General Appearance - Well Developed] : well developed [Normal Appearance] : normal appearance [Well Groomed] : well groomed [General Appearance - Well Nourished] : well nourished [No Deformities] : no deformities [General Appearance - In No Acute Distress] : no acute distress [Normal Jugular Venous V Waves Present] : normal jugular venous V waves present [] : no respiratory distress [Respiration, Rhythm And Depth] : normal respiratory rhythm and effort [Exaggerated Use Of Accessory Muscles For Inspiration] : no accessory muscle use [Auscultation Breath Sounds / Voice Sounds] : lungs were clear to auscultation bilaterally [Heart Sounds] : normal S1 and S2 [Murmurs] : no murmurs present [Arterial Pulses Normal] : the arterial pulses were normal [Edema] : no peripheral edema present [Abnormal Walk] : normal gait [Gait - Sufficient For Exercise Testing] : the gait was sufficient for exercise testing [Nail Clubbing] : no clubbing of the fingernails [Cyanosis, Localized] : no localized cyanosis [FreeTextEntry1] :  irregular irregular

## 2022-10-21 NOTE — REASON FOR VISIT
[Symptom and Test Evaluation] : symptom and test evaluation [Arrhythmia/ECG Abnorrmalities] : arrhythmia/ECG abnormalities [Hyperlipidemia] : hyperlipidemia [Hypertension] : hypertension [Coronary Artery Disease] : coronary artery disease [Carotid, Aortic and Peripheral Vascular Disease] : carotid, aortic and peripheral vascular disease [FreeTextEntry3] : Dr. Lane [FreeTextEntry1] : 77 gentleman comes in for follow-up consultation.  Recent cardiovascular tests were reviewed.\par He plays one and a half hour of singles tennis on a regular basis without any complaint of chest pain, shortness of breath, PND, orthopnea.\par He has no pedal edema.\par He has intermittent dizziness.  He attributes it to BPPV\par He has no claudication pain.\par He has no recent hospital admission for acute coronary syndrome, syncope, or congestive heart failure.\par He has not had any neurological/cardioembolic event.\par He has not had any bleeding complications.\par He has left elbow pain which is being evaluated.\par \par

## 2022-10-21 NOTE — DISCUSSION/SUMMARY
[FreeTextEntry1] : 77-year-old with excellent functional status of >5 METs without any signs of CAD, CHF , stable atrial fibrillation , and on chronic anticoagulation in the presence of preserved LV systolic function, nonischemic myocardial perfusion scan in 2020. \par \par #1  Intermittent dizziness.  Vertiginous.  No orthostasis.  Chronic A. fib.  Rule out bradycardia.  Event monitor recommended.  If any significant worsening he will call 911.\par 2.  Chronic atrial fibrillation.  Rate well controlled.  Stable asymptomatic.  On chronic anticoagulation.  High risk medication.  No bleeding.\par No complication.\par #3 coronary artery disease.  Branch disease in the past.  Evaluation as discussed above.  Continue secondary prevention with aggressive lifestyle and risk factor modification which includes statin therapy.\par 4.  Dyslipidemia.  On Pravachol.  Along with Zetia.  LDL goal less than 70.  Repeat fasting lipid panel ordered.  Continue lifestyle modifications.\par 5.  Hyperglycemia.  Type 2 diabetes mellitus.  On Metformin.  Continue lifestyle modification.  Follow-up with primary care physician.\par 6.  Atherosclerosis of carotids.  Nonobstructive.\par Abdominal aortic ultrasound.  No aneurysm.\par Aggressive lifestyle and risk factor modifications.\par 7..  BPV.  Intermittent dizziness.\par \par Counseling regarding low saturated fat, salt and carbohydrate intake was reviewed. Active lifestyle and regular. Exercise along with weight management is advised.\par All the above were at length reviewed. Answered all the questions. Thank you very much for this kind referral. Please do not hesitate to give me a call for any question.\par Part of this transcription was done with voice recognition software and phonetically similar errors are common. I apologize for that. Please do not hesitate to call for any questions due to above.\par \par He will call for the results\par Follow-up in 6 months or for any change in his symptoms

## 2022-10-21 NOTE — ADDENDUM
[FreeTextEntry1] : Patient had mildly abnormal home sleep study.  Needs further evaluation with sleep specialist/pulmonary evaluation and management.  Referred to pulmonologist for further evaluation management.

## 2022-10-21 NOTE — ASSESSMENT
[FreeTextEntry1] : EKG  ordered and interpreted by me. August 29, 2018  indication atrial fibrillation. Preoperative assessment. Interpretation by me. Atrial fibrillation. Nonspecific ST-T changes.\par \par Labs which were done in August were reviewed, which showed stable CBC, CMP, lipid panel.\par Most recent cardiac testing includes\par Echocardiogram  May 17, 2017  preserved LV systolic function. Stable thoracic aortic dimension. Mild mitral regurgitation.\par Stress myocardial perfusion scan. August 15, 2017 equivocal ischemic EKG changes at 10 METs of workload.\par Perfusion scan showing preserved LV systolic function and fixed apical inferior defect, probably related to attenuation.\par \par Reviewed on April 25, 2019\par EKG April 25, 2019. Indication atrial fibrillation. Interpretation atrial fibrillation. Nonspecific ST-T changes.\par \par Reviewed on July 14, 2021.\par Labs July 10, 2021 sodium 141 potassium 4.8 creatinine 0.95 LFT normal CBC stable\par Most recent LDL cholesterol was 77\par Echocardiogram as noted above\par \par Reviewed on January 4, 2022.\par Labs from November 5, 2021 were reviewed.  Stable CBC CMP noted.  HDL 53 triglycerides 89 total cholesterol 152 LDL 81 reviewed\par \par Reviewed July 19, 2022.\par Echocardiogram, carotid Doppler study, abdominal aortic ultrasound reviewed\par

## 2022-10-21 NOTE — CARDIOLOGY SUMMARY
[de-identified] : October 6, 2020.  Atrial fibrillation.  Right bundle branch block [de-identified] : August 15, 2017 equivocal for ischemia at 10 METs of workload. [de-identified] : June 24, 2021 EF 65% mild mitral regurgitation aortic root 4.2 dilated left atrium concentric LVH moderate to severe right atrial enlargement mild tricuspid regurgitation PASP 27 mmHg.\par 7/12/2022 EF 55% mild to moderate mitral regurgitation severely dilated left atrium.  Moderate right atrial enlargement.  Mild tricuspid regurgitation RVSP 38 mmHg. [de-identified] : 2013.  Branch disease.  50% D1.  Coronary angiography at [facility] ([facility] [de-identified] : Abdominal aortic ultrasound 7/12/2022.  Mild atherosclerosis no aneurysm.\par July 12, 2022 mild nonobstructive bilateral carotid disease.

## 2023-01-31 ENCOUNTER — NON-APPOINTMENT (OUTPATIENT)
Age: 78
End: 2023-01-31

## 2023-01-31 ENCOUNTER — APPOINTMENT (OUTPATIENT)
Dept: CARDIOLOGY | Facility: CLINIC | Age: 78
End: 2023-01-31
Payer: MEDICARE

## 2023-01-31 VITALS
DIASTOLIC BLOOD PRESSURE: 62 MMHG | TEMPERATURE: 97.1 F | WEIGHT: 187 LBS | OXYGEN SATURATION: 98 % | HEIGHT: 71 IN | HEART RATE: 68 BPM | BODY MASS INDEX: 26.18 KG/M2 | SYSTOLIC BLOOD PRESSURE: 122 MMHG

## 2023-01-31 PROCEDURE — 93000 ELECTROCARDIOGRAM COMPLETE: CPT

## 2023-01-31 PROCEDURE — 99214 OFFICE O/P EST MOD 30 MIN: CPT

## 2023-01-31 RX ORDER — PRAVASTATIN SODIUM 40 MG/1
40 TABLET ORAL
Refills: 0 | Status: DISCONTINUED | COMMUNITY
End: 2023-01-31

## 2023-01-31 NOTE — CARDIOLOGY SUMMARY
[de-identified] : October 6, 2020.  Atrial fibrillation.  Right bundle branch block\par January 31, 2020 3P atrial fibrillation right bundle branch block [de-identified] : August 15, 2017 equivocal for ischemia at 10 METs of workload. [de-identified] : June 24, 2021 EF 65% mild mitral regurgitation aortic root 4.2 dilated left atrium concentric LVH moderate to severe right atrial enlargement mild tricuspid regurgitation PASP 27 mmHg.\par 7/12/2022 EF 55% mild to moderate mitral regurgitation severely dilated left atrium.  Moderate right atrial enlargement.  Mild tricuspid regurgitation RVSP 38 mmHg. [de-identified] : 2013.  Branch disease.  50% D1.  Coronary angiography at [facility]. [de-identified] : Abdominal aortic ultrasound 7/12/2022.  Mild atherosclerosis no aneurysm.\par July 12, 2022 mild nonobstructive bilateral carotid disease.

## 2023-01-31 NOTE — DISCUSSION/SUMMARY
[FreeTextEntry1] : 77-year-old with excellent functional status of >5 METs without any signs of CAD, CHF , stable atrial fibrillation , and on chronic anticoagulation in the presence of preserved LV systolic function, nonischemic myocardial perfusion scan in 2020. \par \par #1  Mild to moderate nonrheumatic valvular regurgitation.  Chronic atrial fibrillation.  CAD LVH.  Follow-up as per [place] cardiologist in the city\par 2.  Chronic atrial fibrillation.  Right bundle branch block rate well controlled.  Stable asymptomatic.  On chronic anticoagulation.  High risk medication.  No bleeding.\par No complication.\par #3 coronary artery disease.  Branch disease in the past.  Evaluation as discussed above.  Continue secondary prevention with aggressive lifestyle and risk factor modification which includes statin therapy.\par 4.  Dyslipidemia.  On Pravachol.  Along with Zetia.  LDL goal less than 70.  Will need to consider changing Pravachol to rosuvastatin 20 mg.  Risk benefits alternatives reviewed.  If any adverse events he will contact us immediately.  Repeat labs in 2 to 4 months.\par  continue lifestyle modifications.\par 5.  Hyperglycemia.  Type 2 diabetes mellitus.  On Metformin.  Continue lifestyle modification.  Follow-up with primary care physician.\par 6.  Atherosclerosis of carotids.  Nonobstructive.\par Abdominal aortic ultrasound.  No aneurysm.\par Aggressive lifestyle and risk factor modifications.\par 7..  BPV.  Intermittent dizziness.\par \par Counseling regarding low saturated fat, salt and carbohydrate intake was reviewed. Active lifestyle and regular. Exercise along with weight management is advised.\par All the above were at length reviewed. Answered all the questions. Thank you very much for this kind referral. Please do not hesitate to give me a call for any question.\par Part of this transcription was done with voice recognition software and phonetically similar errors are common. I apologize for that. Please do not hesitate to call for any questions due to above.\par \par Sincerely, Michela Lewis MD,FACC,EMILIA\par \par

## 2023-01-31 NOTE — REVIEW OF SYSTEMS
[Vertigo] : vertigo [Joint Pain] : joint pain [Joint Swelling] : joint swelling [Joint Stiffness] : joint stiffness [Dizziness] : dizziness [Negative] : Heme/Lymph

## 2023-01-31 NOTE — REASON FOR VISIT
[Symptom and Test Evaluation] : symptom and test evaluation [Arrhythmia/ECG Abnorrmalities] : arrhythmia/ECG abnormalities [Hyperlipidemia] : hyperlipidemia [Hypertension] : hypertension [Coronary Artery Disease] : coronary artery disease [Carotid, Aortic and Peripheral Vascular Disease] : carotid, aortic and peripheral vascular disease [FreeTextEntry3] : Dr. Lane [FreeTextEntry1] : 77 gentleman comes in for follow-up consultation.  EKG and labs were reviewed\par He plays one and a half hour of singles tennis on a regular basis without any complaint of chest pain, shortness of breath, PND, orthopnea.\par He has no pedal edema.\par He has intermittent dizziness.  He attributes it to BPPV\par He has no claudication pain.\par He has no recent hospital admission for acute coronary syndrome, syncope, or congestive heart failure.\par He has not had any neurological/cardioembolic event.\par He has not had any bleeding complications.\par \par

## 2023-01-31 NOTE — ASSESSMENT
[FreeTextEntry1] : EKG  ordered and interpreted by me. August 29, 2018  indication atrial fibrillation. Preoperative assessment. Interpretation by me. Atrial fibrillation. Nonspecific ST-T changes.\par \par Labs which were done in August were reviewed, which showed stable CBC, CMP, lipid panel.\par Most recent cardiac testing includes\par Echocardiogram  May 17, 2017  preserved LV systolic function. Stable thoracic aortic dimension. Mild mitral regurgitation.\par Stress myocardial perfusion scan. August 15, 2017 equivocal ischemic EKG changes at 10 METs of workload.\par Perfusion scan showing preserved LV systolic function and fixed apical inferior defect, probably related to attenuation.\par \par Reviewed on April 25, 2019\par EKG April 25, 2019. Indication atrial fibrillation. Interpretation atrial fibrillation. Nonspecific ST-T changes.\par \par Reviewed on July 14, 2021.\par Labs July 10, 2021 sodium 141 potassium 4.8 creatinine 0.95 LFT normal CBC stable\par Most recent LDL cholesterol was 77\par Echocardiogram as noted above\par \par Reviewed on January 4, 2022.\par Labs from November 5, 2021 were reviewed.  Stable CBC CMP noted.  HDL 53 triglycerides 89 total cholesterol 152 LDL 81 reviewed\par \par Reviewed July 19, 2022.\par Echocardiogram, carotid Doppler study, abdominal aortic ultrasound reviewed\par \par Reviewed January 31, 2023.\par Labs from January 23, 2023 stable CMP stable CBC LDL 86 HDL 58 triglycerides 95

## 2023-06-26 ENCOUNTER — RX RENEWAL (OUTPATIENT)
Age: 78
End: 2023-06-26

## 2023-07-19 ENCOUNTER — APPOINTMENT (OUTPATIENT)
Dept: CARDIOLOGY | Facility: CLINIC | Age: 78
End: 2023-07-19
Payer: MEDICARE

## 2023-07-19 VITALS
HEART RATE: 61 BPM | BODY MASS INDEX: 25.48 KG/M2 | HEIGHT: 71 IN | OXYGEN SATURATION: 99 % | SYSTOLIC BLOOD PRESSURE: 126 MMHG | DIASTOLIC BLOOD PRESSURE: 70 MMHG | WEIGHT: 182 LBS

## 2023-07-19 PROCEDURE — 99214 OFFICE O/P EST MOD 30 MIN: CPT

## 2023-07-19 NOTE — REASON FOR VISIT
[Symptom and Test Evaluation] : symptom and test evaluation [Arrhythmia/ECG Abnorrmalities] : arrhythmia/ECG abnormalities [Hyperlipidemia] : hyperlipidemia [Hypertension] : hypertension [Coronary Artery Disease] : coronary artery disease [Carotid, Aortic and Peripheral Vascular Disease] : carotid, aortic and peripheral vascular disease [FreeTextEntry3] : Dr. Lane [FreeTextEntry1] : 78-year-old gentleman comes in for follow-up consultation.  Labs were reviewed.\par He plays one and a half hour of singles tennis on a regular basis without any complaint of chest pain, shortness of breath, PND, orthopnea.\par He has no pedal edema.\par He has intermittent dizziness.  He attributes it to BPPV\par He has no claudication pain.\par He has no recent hospital admission for acute coronary syndrome, syncope, or congestive heart failure.\par He has not had any neurological/cardioembolic event.\par He has not had any bleeding complications.\par \par

## 2023-07-19 NOTE — CARDIOLOGY SUMMARY
[de-identified] : October 6, 2020.  Atrial fibrillation.  Right bundle branch block\par January 31, 2020 3P atrial fibrillation right bundle branch block [de-identified] : August 15, 2017 equivocal for ischemia at 10 METs of workload. [de-identified] : June 24, 2021 EF 65% mild mitral regurgitation aortic root 4.2 dilated left atrium concentric LVH moderate to severe right atrial enlargement mild tricuspid regurgitation PASP 27 mmHg.\par 7/12/2022 EF 55% mild to moderate mitral regurgitation severely dilated left atrium.  Moderate right atrial enlargement.  Mild tricuspid regurgitation RVSP 38 mmHg. [de-identified] : 2013.  Branch disease.  50% D1.  Coronary angiography at [facility]. [de-identified] : Abdominal aortic ultrasound 7/12/2022.  Mild atherosclerosis no aneurysm.\par July 12, 2022 mild nonobstructive bilateral carotid disease.

## 2023-07-19 NOTE — DISCUSSION/SUMMARY
[FreeTextEntry1] : 79-year-old with excellent functional status of >5 METs without any signs of CAD, CHF , stable atrial fibrillation , and on chronic anticoagulation in the presence of preserved LV systolic function, nonischemic myocardial perfusion scan in 2020.  Stable CMP\par \par #1  Mild to moderate nonrheumatic valvular regurgitation.  Chronic atrial fibrillation.  CAD LVH.  Follow-up as per [place] cardiologist in the city\par 2.  Chronic atrial fibrillation.  Right bundle branch block rate well controlled.  Stable asymptomatic.  On chronic anticoagulation.  High risk medication.  No bleeding.\par No complication.\par #3 coronary artery disease.  Branch disease in the past.  Evaluation as discussed above.  Continue secondary prevention with aggressive lifestyle and risk factor modification which includes statin therapy.  Follow-up echocardiogram.\par 4.  Dyslipidemia.  On Pravachol.  Along with Zetia.  LDL goal less than 70.  Will need to consider changing Pravachol to rosuvastatin 20 mg.  Risk benefits alternatives reviewed.  If any adverse events he will contact us immediately.  Repeat labs in 2 to 4 months.\par  continue lifestyle modifications.\par 5.  Hyperglycemia.  Type 2 diabetes mellitus.  On Metformin.  Continue lifestyle modification.  Follow-up with primary care physician.\par 6.  Atherosclerosis of carotids.  Nonobstructive.\par Abdominal aortic ultrasound.  No aneurysm.\par Aggressive lifestyle and risk factor modifications.\par 7..  BPV.  Intermittent dizziness.  History of bradycardia arrhythmias in presence of atrial fibrillation.  7-day event monitor advised.  Any correlation of significant bradycardia with dizziness may require device management\par \par Counseling regarding low saturated fat, salt and carbohydrate intake was reviewed. Active lifestyle and regular. Exercise along with weight management is advised.\par All the above were at length reviewed. Answered all the questions. Thank you very much for this kind referral. Please do not hesitate to give me a call for any question.\par Part of this transcription was done with voice recognition software and phonetically similar errors are common. I apologize for that. Please do not hesitate to call for any questions due to above.\par \par Sincerely, \par Michela Lewis MD,FACC,EMILIA\par \par

## 2023-07-19 NOTE — PHYSICAL EXAM
[General Appearance - Well Developed] : well developed [Normal Appearance] : normal appearance [Well Groomed] : well groomed [General Appearance - Well Nourished] : well nourished [No Deformities] : no deformities [General Appearance - In No Acute Distress] : no acute distress [Normal Jugular Venous V Waves Present] : normal jugular venous V waves present [] : no respiratory distress [Respiration, Rhythm And Depth] : normal respiratory rhythm and effort [Exaggerated Use Of Accessory Muscles For Inspiration] : no accessory muscle use [Auscultation Breath Sounds / Voice Sounds] : lungs were clear to auscultation bilaterally [Heart Sounds] : normal S1 and S2 [Murmurs] : no murmurs present [Arterial Pulses Normal] : the arterial pulses were normal [Edema] : no peripheral edema present [Abnormal Walk] : normal gait [Nail Clubbing] : no clubbing of the fingernails [Gait - Sufficient For Exercise Testing] : the gait was sufficient for exercise testing [Cyanosis, Localized] : no localized cyanosis [FreeTextEntry1] :  irregular irregular

## 2023-07-19 NOTE — ASSESSMENT
[FreeTextEntry1] : EKG  ordered and interpreted by me. August 29, 2018  indication atrial fibrillation. Preoperative assessment. Interpretation by me. Atrial fibrillation. Nonspecific ST-T changes.\par \par Labs which were done in August were reviewed, which showed stable CBC, CMP, lipid panel.\par Most recent cardiac testing includes\par Echocardiogram  May 17, 2017  preserved LV systolic function. Stable thoracic aortic dimension. Mild mitral regurgitation.\par Stress myocardial perfusion scan. August 15, 2017 equivocal ischemic EKG changes at 10 METs of workload.\par Perfusion scan showing preserved LV systolic function and fixed apical inferior defect, probably related to attenuation.\par \par Reviewed on April 25, 2019\par EKG April 25, 2019. Indication atrial fibrillation. Interpretation atrial fibrillation. Nonspecific ST-T changes.\par \par Reviewed on July 14, 2021.\par Labs July 10, 2021 sodium 141 potassium 4.8 creatinine 0.95 LFT normal CBC stable\par Most recent LDL cholesterol was 77\par Echocardiogram as noted above\par \par Reviewed on January 4, 2022.\par Labs from November 5, 2021 were reviewed.  Stable CBC CMP noted.  HDL 53 triglycerides 89 total cholesterol 152 LDL 81 reviewed\par \par Reviewed July 19, 2022.\par Echocardiogram, carotid Doppler study, abdominal aortic ultrasound reviewed\par \par Reviewed January 31, 2023.\par Labs from January 23, 2023 stable CMP stable CBC LDL 86 HDL 58 triglycerides 95\par \par Reviewed on July 19, 2023\par Labs showed stable CMP.  BNP N-terminal pro within normal range for his age

## 2023-08-23 ENCOUNTER — APPOINTMENT (OUTPATIENT)
Dept: CARDIOLOGY | Facility: CLINIC | Age: 78
End: 2023-08-23
Payer: MEDICARE

## 2023-08-23 PROCEDURE — 93306 TTE W/DOPPLER COMPLETE: CPT

## 2023-09-11 DIAGNOSIS — R93.1 ABNORMAL FINDINGS ON DIAGNOSTIC IMAGING OF HEART AND CORONARY CIRCULATION: ICD-10-CM

## 2023-09-14 ENCOUNTER — APPOINTMENT (OUTPATIENT)
Dept: CARDIOLOGY | Facility: CLINIC | Age: 78
End: 2023-09-14
Payer: MEDICARE

## 2023-09-14 VITALS
BODY MASS INDEX: 25.76 KG/M2 | DIASTOLIC BLOOD PRESSURE: 78 MMHG | SYSTOLIC BLOOD PRESSURE: 110 MMHG | HEART RATE: 88 BPM | WEIGHT: 184 LBS | HEIGHT: 71 IN

## 2023-09-14 PROCEDURE — 99214 OFFICE O/P EST MOD 30 MIN: CPT

## 2023-09-14 PROCEDURE — 93308 TTE F-UP OR LMTD: CPT

## 2023-09-28 ENCOUNTER — NON-APPOINTMENT (OUTPATIENT)
Age: 78
End: 2023-09-28

## 2024-01-29 ENCOUNTER — APPOINTMENT (OUTPATIENT)
Dept: OPHTHALMOLOGY | Facility: CLINIC | Age: 79
End: 2024-01-29

## 2024-03-03 NOTE — REVIEW OF SYSTEMS
[Joint Pain] : joint pain [Vertigo] : vertigo [Joint Stiffness] : joint stiffness [Joint Swelling] : joint swelling [Dizziness] : dizziness [Negative] : Heme/Lymph

## 2024-03-05 ENCOUNTER — APPOINTMENT (OUTPATIENT)
Dept: CARDIOLOGY | Facility: CLINIC | Age: 79
End: 2024-03-05
Payer: MEDICARE

## 2024-03-05 ENCOUNTER — NON-APPOINTMENT (OUTPATIENT)
Age: 79
End: 2024-03-05

## 2024-03-05 VITALS
BODY MASS INDEX: 25.2 KG/M2 | SYSTOLIC BLOOD PRESSURE: 120 MMHG | HEART RATE: 70 BPM | HEIGHT: 71 IN | DIASTOLIC BLOOD PRESSURE: 70 MMHG | WEIGHT: 180 LBS | OXYGEN SATURATION: 98 %

## 2024-03-05 DIAGNOSIS — E78.00 PURE HYPERCHOLESTEROLEMIA, UNSPECIFIED: ICD-10-CM

## 2024-03-05 DIAGNOSIS — I25.10 ATHEROSCLEROTIC HEART DISEASE OF NATIVE CORONARY ARTERY W/OUT ANGINA PECTORIS: ICD-10-CM

## 2024-03-05 DIAGNOSIS — I77.810 THORACIC AORTIC ECTASIA: ICD-10-CM

## 2024-03-05 DIAGNOSIS — Z79.01 LONG TERM (CURRENT) USE OF ANTICOAGULANTS: ICD-10-CM

## 2024-03-05 DIAGNOSIS — I48.20 CHRONIC ATRIAL FIBRILLATION, UNSP: ICD-10-CM

## 2024-03-05 DIAGNOSIS — Q21.12 PATENT FORAMEN OVALE: ICD-10-CM

## 2024-03-05 DIAGNOSIS — Z79.899 OTHER LONG TERM (CURRENT) DRUG THERAPY: ICD-10-CM

## 2024-03-05 PROCEDURE — 99214 OFFICE O/P EST MOD 30 MIN: CPT

## 2024-03-05 PROCEDURE — 93000 ELECTROCARDIOGRAM COMPLETE: CPT

## 2024-03-05 NOTE — REASON FOR VISIT
[Symptom and Test Evaluation] : symptom and test evaluation [Arrhythmia/ECG Abnorrmalities] : arrhythmia/ECG abnormalities [Hypertension] : hypertension [Hyperlipidemia] : hyperlipidemia [Coronary Artery Disease] : coronary artery disease [Carotid, Aortic and Peripheral Vascular Disease] : carotid, aortic and peripheral vascular disease [FreeTextEntry3] : Dr. Lane [FreeTextEntry1] : 78-year-old gentleman comes in for clinical follow-up.  Since last seen he had echocardiogram and bubble study which were reviewed with him.  I have reviewed his recent labs.  An EKG from today. He plays one and a half hour of singles tennis on a regular basis without any complaint of chest pain, shortness of breath, PND, orthopnea. He has no pedal edema. He has intermittent dizziness.  He attributes it to BPPV He has no claudication pain. He has no recent hospital admission for acute coronary syndrome, syncope, or congestive heart failure. He has not had any neurological/cardioembolic event. He has not had any bleeding complications.

## 2024-03-05 NOTE — DISCUSSION/SUMMARY
[FreeTextEntry1] : 78-year-old with excellent functional status of >5 METs without any signs of CAD, CHF , stable atrial fibrillation , and on chronic anticoagulation in the presence of preserved LV systolic function, nonischemic myocardial perfusion scan in 2020.  Stable CMP  #1  Mild to moderate nonrheumatic valvular regurgitation.  Chronic atrial fibrillation.  CAD LVH.  Biatrial enlargement.  Probably in relation to hypertensive heart disease, atrial fibrillation and mild to moderate valvular abnormality.  Very low likelihood small PFO is contributing to the dimension of the atria. Reviewed with him at length.  PFO was not noted in the past.  Not sure whether changing atrial size is contributing to now evidence of left-to-right shunt.  And most likely chronic atrial fibrillation is the reason for progressive worsening of atrial dimensions. Follow-up echocardiogram.  Continue blood pressure heart rate control. 2.  Chronic atrial fibrillation.  Right bundle branch block rate well controlled.  Stable asymptomatic.  On chronic anticoagulation.  High risk medication.  No bleeding. No complication. #3 coronary artery disease.  Branch disease in the past.  Evaluation as discussed above.  Continue secondary prevention with aggressive lifestyle and risk factor modification which includes statin therapy.  4.  Dyslipidemia.  comtinue with rosuva 40 and zetia 10 mg  LDL goal less than 70.    continue lifestyle modifications. 5.  Hyperglycemia.  Type 2 diabetes mellitus.  On Metformin.  Continue lifestyle modification.  Follow-up with primary care physician. 6.  Atherosclerosis of carotids.  Nonobstructive. Abdominal aortic ultrasound.  No aneurysm. Aggressive lifestyle and risk factor modifications. 7..  BPV.  Intermittent dizziness.  History of bradycardia arrhythmias in presence of atrial fibrillation.  7-day event monitor advised.  Any correlation of significant bradycardia with dizziness may require device management 8.  PFO.  Incidental finding.  Discussed pathophysiology with him.  He is already on anticoagulation.  No history of cardioembolic event.  No clinical indication for PFO closure.  Answered all his questions.  Counseling regarding low saturated fat, salt and carbohydrate intake was reviewed. Active lifestyle and regular. Exercise along with weight management is advised. All the above were at length reviewed. Answered all the questions. Thank you very much for this kind referral. Please do not hesitate to give me a call for any question. Part of this transcription was done with voice recognition software and phonetically similar errors are common. I apologize for that. Please do not hesitate to call for any questions due to above.  Sincerely,  Michela Lewis MD,FACC,EMILIA

## 2024-03-05 NOTE — CARDIOLOGY SUMMARY
[de-identified] : October 6, 2020.  Atrial fibrillation.  Right bundle branch block January 31, 2023 atrial fibrillation right bundle branch block March 5, 2024.  Atrial fibrillation.  Right bundle branch block. [de-identified] : August 15, 2017 equivocal for ischemia at 10 METs of workload. [de-identified] : June 24, 2021 EF 65% mild mitral regurgitation aortic root 4.2 dilated left atrium concentric LVH moderate to severe right atrial enlargement mild tricuspid regurgitation PASP 27 mmHg. 7/12/2022 EF 55% mild to moderate mitral regurgitation severely dilated left atrium.  Moderate right atrial enlargement.  Mild tricuspid regurgitation RVSP 38 mmHg. August 23, 2023 EF 60 to 65% biatrial enlargement.  PFO.  Mild MR and TR.  Borderline aortic root dimensions. September 14, 2023.  Agitated saline bubble study.  Color Doppler study.  PFO on color Doppler study.  Possible some right-to-left bubbles suggestive of atrial level shunt. [de-identified] : 2013.  Branch disease.  50% D1.  Coronary angiography at [facility]. [de-identified] : Abdominal aortic ultrasound 7/12/2022.  Mild atherosclerosis no aneurysm.\par  July 12, 2022 mild nonobstructive bilateral carotid disease.

## 2024-03-05 NOTE — ASSESSMENT
[FreeTextEntry1] : EKG  ordered and interpreted by me. August 29, 2018  indication atrial fibrillation. Preoperative assessment. Interpretation by me. Atrial fibrillation. Nonspecific ST-T changes.  Labs which were done in August were reviewed, which showed stable CBC, CMP, lipid panel. Most recent cardiac testing includes Echocardiogram  May 17, 2017  preserved LV systolic function. Stable thoracic aortic dimension. Mild mitral regurgitation. Stress myocardial perfusion scan. August 15, 2017 equivocal ischemic EKG changes at 10 METs of workload. Perfusion scan showing preserved LV systolic function and fixed apical inferior defect, probably related to attenuation.  Reviewed on April 25, 2019 EKG April 25, 2019. Indication atrial fibrillation. Interpretation atrial fibrillation. Nonspecific ST-T changes.  Reviewed on July 14, 2021. Labs July 10, 2021 sodium 141 potassium 4.8 creatinine 0.95 LFT normal CBC stable Most recent LDL cholesterol was 77 Echocardiogram as noted above  Reviewed on January 4, 2022. Labs from November 5, 2021 were reviewed.  Stable CBC CMP noted.  HDL 53 triglycerides 89 total cholesterol 152 LDL 81 reviewed  Reviewed July 19, 2022. Echocardiogram, carotid Doppler study, abdominal aortic ultrasound reviewed  Reviewed January 31, 2023. Labs from January 23, 2023 stable CMP stable CBC LDL 86 HDL 58 triglycerides 95  Reviewed on July 19, 2023 Labs showed stable CMP.  BNP N-terminal pro within normal range for his age  Reviewed on September 14, 2023. Echocardiograms reviewed.  Evidence of preserved EF.  Biatrial enlargement.  PFO.  Reviewed with him.  Reviewed on March 5, 2024.  EKG and echocardiogram with bubble studies were reviewed. Recent labs February 28, 2024 CBC CMP stable.  Creatinine 0.84 potassium 4.1 sodium 142.  Mildly elevated bilirubin.  Hemoglobin A1c 5.2.  LDL cholesterol 59 HDL 45 triglycerides 82.

## 2024-03-05 NOTE — PHYSICAL EXAM
[Normal Venous Pressure] : normal venous pressure [Carotid Bruit] : carotid bruit [Murmur] : murmur [Normal] : clear lung fields, good air entry, no respiratory distress [Normal Gait] : normal gait [No Edema] : no edema [Normal Radial B/L] : normal radial B/L [Normal PT B/L] : normal PT B/L [Alert and Oriented] : alert and oriented [Normal Speech] : normal speech [de-identified] : Irregularly irregular.  Systolic ejection murmur 2-3/6.  No gallop or rub.

## 2024-05-14 ENCOUNTER — RX RENEWAL (OUTPATIENT)
Age: 79
End: 2024-05-14

## 2024-05-14 RX ORDER — ROSUVASTATIN CALCIUM 40 MG/1
40 TABLET, FILM COATED ORAL DAILY
Qty: 90 | Refills: 3 | Status: ACTIVE | COMMUNITY
Start: 2023-01-31 | End: 1900-01-01

## 2024-08-14 ENCOUNTER — APPOINTMENT (OUTPATIENT)
Dept: CARDIOLOGY | Facility: CLINIC | Age: 79
End: 2024-08-14
Payer: MEDICARE

## 2024-08-14 VITALS
SYSTOLIC BLOOD PRESSURE: 128 MMHG | HEART RATE: 56 BPM | OXYGEN SATURATION: 100 % | BODY MASS INDEX: 24.92 KG/M2 | DIASTOLIC BLOOD PRESSURE: 82 MMHG | WEIGHT: 178 LBS | HEIGHT: 71 IN

## 2024-08-14 DIAGNOSIS — Z79.01 LONG TERM (CURRENT) USE OF ANTICOAGULANTS: ICD-10-CM

## 2024-08-14 DIAGNOSIS — Z79.899 OTHER LONG TERM (CURRENT) DRUG THERAPY: ICD-10-CM

## 2024-08-14 DIAGNOSIS — I48.20 CHRONIC ATRIAL FIBRILLATION, UNSP: ICD-10-CM

## 2024-08-14 DIAGNOSIS — I25.10 ATHEROSCLEROTIC HEART DISEASE OF NATIVE CORONARY ARTERY W/OUT ANGINA PECTORIS: ICD-10-CM

## 2024-08-14 DIAGNOSIS — I77.810 THORACIC AORTIC ECTASIA: ICD-10-CM

## 2024-08-14 DIAGNOSIS — Q21.12 PATENT FORAMEN OVALE: ICD-10-CM

## 2024-08-14 DIAGNOSIS — E78.00 PURE HYPERCHOLESTEROLEMIA, UNSPECIFIED: ICD-10-CM

## 2024-08-14 PROCEDURE — 99214 OFFICE O/P EST MOD 30 MIN: CPT

## 2024-08-14 PROCEDURE — G2211 COMPLEX E/M VISIT ADD ON: CPT

## 2024-08-14 NOTE — CARDIOLOGY SUMMARY
[de-identified] : October 6, 2020.  Atrial fibrillation.  Right bundle branch block January 31, 2023 atrial fibrillation right bundle branch block March 5, 2024.  Atrial fibrillation.  Right bundle branch block. [de-identified] : August 15, 2017 equivocal for ischemia at 10 METs of workload. [de-identified] : June 24, 2021 EF 65% mild mitral regurgitation aortic root 4.2 dilated left atrium concentric LVH moderate to severe right atrial enlargement mild tricuspid regurgitation PASP 27 mmHg. 7/12/2022 EF 55% mild to moderate mitral regurgitation severely dilated left atrium.  Moderate right atrial enlargement.  Mild tricuspid regurgitation RVSP 38 mmHg. August 23, 2023 EF 60 to 65% biatrial enlargement.  PFO.  Mild MR and TR.  Borderline aortic root dimensions. September 14, 2023.  Agitated saline bubble study.  Color Doppler study.  PFO on color Doppler study.  Possible some right-to-left bubbles suggestive of atrial level shunt. [de-identified] : 2013.  Branch disease.  50% D1.  Coronary angiography at [facility]. [de-identified] : Abdominal aortic ultrasound 7/12/2022.  Mild atherosclerosis no aneurysm.\par  July 12, 2022 mild nonobstructive bilateral carotid disease.

## 2024-08-14 NOTE — PHYSICAL EXAM
[Normal] : well developed, well nourished, no acute distress [Normal Venous Pressure] : normal venous pressure [Murmur] : murmur [Clear Lung Fields] : clear lung fields [Soft] : abdomen soft [Normal Gait] : normal gait [No Edema] : no edema [Normal Radial B/L] : normal radial B/L [Normal DP B/L] : normal DP B/L [Normal Speech] : normal speech [Alert and Oriented] : alert and oriented [de-identified] : irreg irreg

## 2024-08-14 NOTE — REASON FOR VISIT
[Symptom and Test Evaluation] : symptom and test evaluation [Arrhythmia/ECG Abnorrmalities] : arrhythmia/ECG abnormalities [Hyperlipidemia] : hyperlipidemia [Hypertension] : hypertension [Coronary Artery Disease] : coronary artery disease [Carotid, Aortic and Peripheral Vascular Disease] : carotid, aortic and peripheral vascular disease [FreeTextEntry3] : Dr. Lane [FreeTextEntry1] : 79-year-old gentleman comes in for clinical follow-up.  Labs were reviewed. He plays one and a half hour of singles tennis on a regular basis without any complaint of chest pain, shortness of breath, PND, orthopnea. He has no pedal edema. He has intermittent dizziness.  He attributes it to BPPV He has no claudication pain. He has no recent hospital admission for acute coronary syndrome, syncope, or congestive heart failure. He has not had any neurological/cardioembolic event. He has not had any bleeding complications.

## 2024-08-14 NOTE — DISCUSSION/SUMMARY
[FreeTextEntry1] : 79-year-old with excellent functional status of >5 METs without any signs of CAD, CHF , stable atrial fibrillation , and on chronic anticoagulation in the presence of preserved LV systolic function, nonischemic myocardial perfusion scan in 2020.  Stable CMP  #1  Mild to moderate nonrheumatic valvular regurgitation.  Chronic atrial fibrillation.  CAD LVH.  Biatrial enlargement.  Probably in relation to hypertensive heart disease, atrial fibrillation and mild to moderate valvular abnormality.  Very low likelihood small PFO is contributing to the dimension of the atria. Reviewed with him at length.  PFO was not noted in the past.  Not sure whether changing atrial size is contributing to now evidence of left-to-right shunt.  And most likely chronic atrial fibrillation is the reason for progressive worsening of atrial dimensions. Follow-up echocardiogram.  Continue blood pressure heart rate control. 2.  Chronic atrial fibrillation.  Right bundle branch block rate well controlled.  Stable asymptomatic.  On chronic anticoagulation.  High risk medication.  No bleeding. No complication. #3 coronary artery disease.  Branch disease in the past.  Evaluation as discussed above.  Continue secondary prevention with aggressive lifestyle and risk factor modification which includes statin therapy.  4.  Dyslipidemia.  comtinue with rosuvastatin 40 and zetia 10 mg  LDL goal less than 70.    continue lifestyle modifications. 5.  Hyperglycemia.  Type 2 diabetes mellitus.  On Metformin.  Continue lifestyle modification.  Follow-up with primary care physician. 6.  Atherosclerosis of carotids.  Nonobstructive. Abdominal aortic ultrasound.  No aneurysm. Aggressive lifestyle and risk factor modifications. 7.  PFO.  Incidental finding.  Discussed pathophysiology with him.  He is already on anticoagulation.  No history of cardioembolic event.  No clinical indication for PFO closure.  Answered all his questions.  Counseling regarding low saturated fat, salt and carbohydrate intake was reviewed. Active lifestyle and regular. Exercise along with weight management is advised. All the above were at length reviewed. Answered all the questions. Thank you very much for this kind referral. Please do not hesitate to give me a call for any question. Part of this transcription was done with voice recognition software and phonetically similar errors are common. I apologize for that. Please do not hesitate to call for any questions due to above.  Sincerely,  Michela Lewis MD,[facility],EMIL[place]

## 2024-08-14 NOTE — ASSESSMENT
[FreeTextEntry1] : EKG  ordered and interpreted by me. August 29, 2018  indication atrial fibrillation. Preoperative assessment. Interpretation by me. Atrial fibrillation. Nonspecific ST-T changes.  Labs which were done in August were reviewed, which showed stable CBC, CMP, lipid panel. Most recent cardiac testing includes Echocardiogram  May 17, 2017  preserved LV systolic function. Stable thoracic aortic dimension. Mild mitral regurgitation. Stress myocardial perfusion scan. August 15, 2017 equivocal ischemic EKG changes at 10 METs of workload. Perfusion scan showing preserved LV systolic function and fixed apical inferior defect, probably related to attenuation.  Reviewed on April 25, 2019 EKG April 25, 2019. Indication atrial fibrillation. Interpretation atrial fibrillation. Nonspecific ST-T changes.  Reviewed on July 14, 2021. Labs July 10, 2021 sodium 141 potassium 4.8 creatinine 0.95 LFT normal CBC stable Most recent LDL cholesterol was 77 Echocardiogram as noted above  Reviewed on January 4, 2022. Labs from November 5, 2021 were reviewed.  Stable CBC CMP noted.  HDL 53 triglycerides 89 total cholesterol 152 LDL 81 reviewed  Reviewed July 19, 2022. Echocardiogram, carotid Doppler study, abdominal aortic ultrasound reviewed  Reviewed January 31, 2023. Labs from January 23, 2023 stable CMP stable CBC LDL 86 HDL 58 triglycerides 95  Reviewed on July 19, 2023 Labs showed stable CMP.  BNP N-terminal pro within normal range for his age  Reviewed on September 14, 2023. Echocardiograms reviewed.  Evidence of preserved EF.  Biatrial enlargement.  PFO.  Reviewed with him.  Reviewed on March 5, 2024.  EKG and echocardiogram with bubble studies were reviewed. Recent labs February 28, 2024 CBC CMP stable.  Creatinine 0.84 potassium 4.1 sodium 142.  Mildly elevated bilirubin.  Hemoglobin A1c 5.2.  LDL cholesterol 59 HDL 45 triglycerides 82.  revieewd on 8/14/24  cbc, cmp stable

## 2024-08-14 NOTE — PHYSICAL EXAM
[Normal] : well developed, well nourished, no acute distress [Normal Venous Pressure] : normal venous pressure [Murmur] : murmur [Clear Lung Fields] : clear lung fields [Soft] : abdomen soft [Normal Gait] : normal gait [No Edema] : no edema [Normal Radial B/L] : normal radial B/L [Normal DP B/L] : normal DP B/L [Normal Speech] : normal speech [Alert and Oriented] : alert and oriented [de-identified] : irreg irreg

## 2024-08-14 NOTE — DISCUSSION/SUMMARY
[FreeTextEntry1] : 79-year-old with excellent functional status of >5 METs without any signs of CAD, CHF , stable atrial fibrillation , and on chronic anticoagulation in the presence of preserved LV systolic function, nonischemic myocardial perfusion scan in 2020.  Stable CMP  #1  Mild to moderate nonrheumatic valvular regurgitation.  Chronic atrial fibrillation.  CAD LVH.  Biatrial enlargement.  Probably in relation to hypertensive heart disease, atrial fibrillation and mild to moderate valvular abnormality.  Very low likelihood small PFO is contributing to the dimension of the atria. Reviewed with him at length.  PFO was not noted in the past.  Not sure whether changing atrial size is contributing to now evidence of left-to-right shunt.  And most likely chronic atrial fibrillation is the reason for progressive worsening of atrial dimensions. Follow-up echocardiogram.  Continue blood pressure heart rate control. 2.  Chronic atrial fibrillation.  Right bundle branch block rate well controlled.  Stable asymptomatic.  On chronic anticoagulation.  High risk medication.  No bleeding. No complication. #3 coronary artery disease.  Branch disease in the past.  Evaluation as discussed above.  Continue secondary prevention with aggressive lifestyle and risk factor modification which includes statin therapy.  4.  Dyslipidemia.  comtinue with rosuvastatin 40 and zetia 10 mg  LDL goal less than 70.    continue lifestyle modifications. 5.  Hyperglycemia.  Type 2 diabetes mellitus.  On Metformin.  Continue lifestyle modification.  Follow-up with primary care physician. 6.  Atherosclerosis of carotids.  Nonobstructive. Abdominal aortic ultrasound.  No aneurysm. Aggressive lifestyle and risk factor modifications. 7.  PFO.  Incidental finding.  Discussed pathophysiology with him.  He is already on anticoagulation.  No history of cardioembolic event.  No clinical indication for PFO closure.  Answered all his questions.  Counseling regarding low saturated fat, salt and carbohydrate intake was reviewed. Active lifestyle and regular. Exercise along with weight management is advised. All the above were at length reviewed. Answered all the questions. Thank you very much for this kind referral. Please do not hesitate to give me a call for any question. Part of this transcription was done with voice recognition software and phonetically similar errors are common. I apologize for that. Please do not hesitate to call for any questions due to above.  Sincerely,  Michela Lewis MD,[facility],EMIL[place]

## 2024-08-14 NOTE — CARDIOLOGY SUMMARY
[de-identified] : October 6, 2020.  Atrial fibrillation.  Right bundle branch block January 31, 2023 atrial fibrillation right bundle branch block March 5, 2024.  Atrial fibrillation.  Right bundle branch block. [de-identified] : August 15, 2017 equivocal for ischemia at 10 METs of workload. [de-identified] : June 24, 2021 EF 65% mild mitral regurgitation aortic root 4.2 dilated left atrium concentric LVH moderate to severe right atrial enlargement mild tricuspid regurgitation PASP 27 mmHg. 7/12/2022 EF 55% mild to moderate mitral regurgitation severely dilated left atrium.  Moderate right atrial enlargement.  Mild tricuspid regurgitation RVSP 38 mmHg. August 23, 2023 EF 60 to 65% biatrial enlargement.  PFO.  Mild MR and TR.  Borderline aortic root dimensions. September 14, 2023.  Agitated saline bubble study.  Color Doppler study.  PFO on color Doppler study.  Possible some right-to-left bubbles suggestive of atrial level shunt. [de-identified] : 2013.  Branch disease.  50% D1.  Coronary angiography at [facility]. [de-identified] : Abdominal aortic ultrasound 7/12/2022.  Mild atherosclerosis no aneurysm.\par  July 12, 2022 mild nonobstructive bilateral carotid disease.

## 2024-09-29 ENCOUNTER — NON-APPOINTMENT (OUTPATIENT)
Age: 79
End: 2024-09-29

## 2024-10-29 ENCOUNTER — APPOINTMENT (OUTPATIENT)
Dept: CARDIOLOGY | Facility: CLINIC | Age: 79
End: 2024-10-29

## 2024-11-19 ENCOUNTER — NON-APPOINTMENT (OUTPATIENT)
Age: 79
End: 2024-11-19

## 2024-11-26 ENCOUNTER — APPOINTMENT (OUTPATIENT)
Dept: CARDIOLOGY | Facility: CLINIC | Age: 79
End: 2024-11-26

## 2025-04-22 ENCOUNTER — APPOINTMENT (OUTPATIENT)
Dept: CARDIOLOGY | Facility: CLINIC | Age: 80
End: 2025-04-22
Payer: MEDICARE

## 2025-04-22 ENCOUNTER — NON-APPOINTMENT (OUTPATIENT)
Age: 80
End: 2025-04-22

## 2025-04-22 VITALS
WEIGHT: 180 LBS | BODY MASS INDEX: 25.2 KG/M2 | SYSTOLIC BLOOD PRESSURE: 138 MMHG | HEIGHT: 71 IN | HEART RATE: 74 BPM | DIASTOLIC BLOOD PRESSURE: 72 MMHG | OXYGEN SATURATION: 96 %

## 2025-04-22 DIAGNOSIS — I25.10 ATHEROSCLEROTIC HEART DISEASE OF NATIVE CORONARY ARTERY W/OUT ANGINA PECTORIS: ICD-10-CM

## 2025-04-22 DIAGNOSIS — E78.00 PURE HYPERCHOLESTEROLEMIA, UNSPECIFIED: ICD-10-CM

## 2025-04-22 DIAGNOSIS — Z79.01 LONG TERM (CURRENT) USE OF ANTICOAGULANTS: ICD-10-CM

## 2025-04-22 DIAGNOSIS — I48.20 CHRONIC ATRIAL FIBRILLATION, UNSP: ICD-10-CM

## 2025-04-22 DIAGNOSIS — I77.810 THORACIC AORTIC ECTASIA: ICD-10-CM

## 2025-04-22 DIAGNOSIS — R00.1 BRADYCARDIA, UNSPECIFIED: ICD-10-CM

## 2025-04-22 PROCEDURE — G2211 COMPLEX E/M VISIT ADD ON: CPT

## 2025-04-22 PROCEDURE — 99214 OFFICE O/P EST MOD 30 MIN: CPT

## 2025-04-22 PROCEDURE — 93000 ELECTROCARDIOGRAM COMPLETE: CPT

## 2025-08-12 ENCOUNTER — NON-APPOINTMENT (OUTPATIENT)
Age: 80
End: 2025-08-12